# Patient Record
Sex: FEMALE | Race: WHITE | NOT HISPANIC OR LATINO | Employment: FULL TIME | URBAN - METROPOLITAN AREA
[De-identification: names, ages, dates, MRNs, and addresses within clinical notes are randomized per-mention and may not be internally consistent; named-entity substitution may affect disease eponyms.]

---

## 2023-11-22 ENCOUNTER — OFFICE VISIT (OUTPATIENT)
Dept: FAMILY MEDICINE | Facility: CLINIC | Age: 28
End: 2023-11-22
Payer: COMMERCIAL

## 2023-11-22 VITALS
OXYGEN SATURATION: 94 % | WEIGHT: 293 LBS | DIASTOLIC BLOOD PRESSURE: 94 MMHG | HEIGHT: 70 IN | BODY MASS INDEX: 41.95 KG/M2 | TEMPERATURE: 98.4 F | SYSTOLIC BLOOD PRESSURE: 160 MMHG | HEART RATE: 88 BPM

## 2023-11-22 DIAGNOSIS — F41.9 ANXIETY: ICD-10-CM

## 2023-11-22 DIAGNOSIS — E11.9 TYPE 2 DIABETES MELLITUS WITHOUT COMPLICATION, WITHOUT LONG-TERM CURRENT USE OF INSULIN (H): Primary | ICD-10-CM

## 2023-11-22 DIAGNOSIS — F33.0 MILD EPISODE OF RECURRENT MAJOR DEPRESSIVE DISORDER (H): ICD-10-CM

## 2023-11-22 DIAGNOSIS — J45.20 MILD INTERMITTENT ASTHMA WITHOUT COMPLICATION: ICD-10-CM

## 2023-11-22 DIAGNOSIS — I10 BENIGN ESSENTIAL HYPERTENSION: ICD-10-CM

## 2023-11-22 LAB — HBA1C MFR BLD: 7.5 % (ref 0–5.6)

## 2023-11-22 PROCEDURE — 80048 BASIC METABOLIC PNL TOTAL CA: CPT | Performed by: NURSE PRACTITIONER

## 2023-11-22 PROCEDURE — 80061 LIPID PANEL: CPT | Performed by: NURSE PRACTITIONER

## 2023-11-22 PROCEDURE — 99204 OFFICE O/P NEW MOD 45 MIN: CPT | Performed by: NURSE PRACTITIONER

## 2023-11-22 PROCEDURE — 36415 COLL VENOUS BLD VENIPUNCTURE: CPT | Performed by: NURSE PRACTITIONER

## 2023-11-22 PROCEDURE — 83036 HEMOGLOBIN GLYCOSYLATED A1C: CPT | Performed by: NURSE PRACTITIONER

## 2023-11-22 RX ORDER — LISINOPRIL 10 MG/1
10 TABLET ORAL DAILY
Qty: 90 TABLET | Refills: 1 | Status: SHIPPED | OUTPATIENT
Start: 2023-11-22 | End: 2024-05-21

## 2023-11-22 RX ORDER — ALBUTEROL SULFATE 90 UG/1
2 AEROSOL, METERED RESPIRATORY (INHALATION) EVERY 4 HOURS PRN
Qty: 18 G | Refills: 11 | Status: SHIPPED | OUTPATIENT
Start: 2023-11-22

## 2023-11-22 RX ORDER — ALBUTEROL SULFATE 90 UG/1
2 AEROSOL, METERED RESPIRATORY (INHALATION) EVERY 4 HOURS PRN
COMMUNITY
Start: 2022-09-12 | End: 2023-11-22

## 2023-11-22 RX ORDER — METFORMIN HCL 500 MG
1000 TABLET, EXTENDED RELEASE 24 HR ORAL
COMMUNITY
Start: 2022-10-14 | End: 2023-11-22

## 2023-11-22 RX ORDER — VENLAFAXINE HYDROCHLORIDE 150 MG/1
150 CAPSULE, EXTENDED RELEASE ORAL DAILY
Qty: 90 CAPSULE | Refills: 1 | Status: SHIPPED | OUTPATIENT
Start: 2023-11-22 | End: 2024-05-21

## 2023-11-22 RX ORDER — METFORMIN HCL 500 MG
1000 TABLET, EXTENDED RELEASE 24 HR ORAL
Qty: 180 TABLET | Refills: 1 | Status: SHIPPED | OUTPATIENT
Start: 2023-11-22 | End: 2024-05-21

## 2023-11-22 RX ORDER — VENLAFAXINE HYDROCHLORIDE 150 MG/1
150 CAPSULE, EXTENDED RELEASE ORAL DAILY
COMMUNITY
Start: 2023-10-02 | End: 2023-11-22

## 2023-11-22 NOTE — COMMUNITY RESOURCES LIST (ENGLISH)
11/22/2023   [facility]  N/A  For additional resource needs, please contact your health insurance member services or your primary care team.  Phone: [phone]   Email: N/A   Address: [facility] [place] [address]   Hours: N/A        Food and Nutrition       Food pantry  1  [facility] ([facility] Food Shelf) Distance: 1.34 miles      Pickup   [address] [place] [address]  Language: English  Hours: Mon 10:00 AM - 3:00 PM , Tue 12:00 PM - 5:00 PM , Wed 10:00 AM - 3:00 PM , Thu 2:00 PM - 7:00 PM  Fees: Free   Phone: (338) 923-7697 Email: support@[facility] Center[facility].Matchalarm Website: http://[facility] CenterFirst Wave Technologies.org     2  [facility] - Family Table Meals - [facility] Food Shelf Distance: 2.45 miles      Pickup   [address] [place] [address]  Language: English  Hours: Wed 12:00 PM - 2:00 PM  Fees: Free   Phone: (421) 321-2276 Email: Intellicyt@[facility].Matchalarm Website: http://www.Symphony.org     SNAP application assistance  3  [employer] [place] Distance: 20.28 miles      Phone/Virtual   [address] [facility] [address] [place] [address]  Language: English, Hmong, Tuvaluan, Honduran, Cymraes  Hours: Mon - Fri 8:30 AM - 4:30 PM  Fees: Free   Phone: (397) 319-2792 Email: helpline@hungersolutions.org Website: https://www.hungersolutions.org/programs/[place]-food-helpline/     4  [facility] Economic Assistance Department Distance: 7.14 miles      Phone/Virtual   [address] [address]  Language: English  Hours: Mon - Fri 8:15 AM - 4:00 PM  Fees: Free   Phone: (873) 307-3591 Email: paperwork@[facility]. Website: http://www.[facility]./193/Economic-Assistance     Soup kitchen or free meals  5  [facility] Meals Distance: 2.16 miles      In-Person   [address] [place] [address]  Language: English  Hours: Wed 5:30 PM - 6:15 PM  Fees: Free   Phone: (227) 516-8363 Email: karlee@[facility]  Website: http://www.LS9-ScienceLogicka.org/     6  [facility] - Family Table Meals - Family Table Meal Distance: 2.45 miles      [facility]   [address] [place] [address]  Language: English  Hours: Thu 5:00 PM - 6:30 PM  Fees: Free   Phone: (643) 545-7699 Email: brenton@Vitrina.Deskidea Website: http://www.[facility].Deskidea          Important Numbers & Websites       [place] [address] 211itedway.org  Poison Control   (852) 940-2173 [place].org  Suicide and Crisis Lifeline   986 98[facility] Medical Centerline.org  Childhelp [place] Child Abuse Hotline   852.642.4941 Childhelphotline.org  National Sexual Assault Hotline   (681) 122-4236 (HOPE) [facility] Runaway Safeline   (803) 456-3568 (RUNAWAY) [facility] E[facility] Clairerunaway.org  Pregnancy & Postpartum Support [place]   Call/text 733-734-2450 Ppsupportmn.org  Substance Abuse National Helpline ([facility]   179-023-HELP (5354) Findtreatment.gov  Emergency Services   911

## 2023-11-22 NOTE — COMMUNITY RESOURCES LIST (ENGLISH)
11/22/2023   [facility]  N/A  For questions about this resource list or additional care needs, please contact your primary care clinic or care manager.  Phone: [phone]   Email: N/A   Address: [address] [place] [address]   Hours: N/A        Food and Nutrition       Food pantry  1  [facility] ([facility] Food Shelf) Distance: 1.34 miles      Pickup   [address] [place] [address]  Language: English  Hours: Mon 10:00 AM - 3:00 PM , Tue 12:00 PM - 5:00 PM , Wed 10:00 AM - 3:00 PM , Thu 2:00 PM - 7:00 PM  Fees: Free   Phone: (866) 860-2360 Email: support@[facility] CenterTRA.[place] Printer Service Website: http://GoCoinbcValidas.org     2  [facility] - Family Table Meals - [facility] Food [facility] Distance: 2.45 miles      Pickup   [address] [place] [address]  Language: English  Hours: Wed 12:00 PM - 2:00 PM  Fees: Free   Phone: (167) 520-7758 Email: Heartbeater.com@The Personal Bee.[place] Pr[employer] Service Website: http://www.The Personal Bee.org     SNAP application assistance  3  [facility] Economic Assistance Department Distance: 7.14 miles      Phone/Virtual   [address] [address]  Language: English  Hours: Mon - Fri 8:15 AM - 4:00 PM  Fees: Free   Phone: (822) 114-9293 Email: paperwork@co.[place].mn. Website: http://www.Gowrievocaltap./193/Economic-Assistance     4  [facility] [place] [facility] Distance: 7.22 miles      Phone/Virtual   [address] [place] [address]  Language: English, Montserratian  Hours: Mon 9:00 AM - 1:00 PM , Tue - Thu 9:00 AM - 4:00 PM , Fri 9:00 AM - 1:00 PM  Fees: Free   Phone: (991) 477-7864 Email: info@[employer].org Website: http://www.Simpa Networksarvest.org/     Soup kitchen or free meals  5  [facility] Meals Distance: 2.16 miles      In-Person   [address] [place] [address]  Language: English  Hours: Wed 5:30 PM - 6:15 PM  Fees: Free   Phone: (498) 461-9294 Email: karlee@[facility]  Website: http://www.zSoup-ExecMobileka.org/     6  [facility] - Family Table Meals - Family Table Meal Distance: 2.45 miles      [facility]   [address] [place] [address]  Language: English  Hours: Thu 5:00 PM - 6:30 PM  Fees: Free   Phone: (783) 996-3794 Email: brenton@RaveMobileSafety.com.Alianza Website: http://www.[facility].Alianza          Important Numbers & Websites       Emergency Services   911  [facility] Services   311  Poison Control   (875) 878-1141  Suicide Prevention Lifeline   (715) 653-7293 (TALK)  Child Abuse Hotline   (415) 607-2849 (4-A-Child)  Sexual Assault Hotline   (706) 199-2544 (HOPE)  National Runaway Safeline   (529) 466-4867 (RUNAWAY)  All-Options Talkline   (818) 526-7366  Substance Abuse Referral   (379) 588-4515 (HELP)

## 2023-11-22 NOTE — PROGRESS NOTES
"      Assessment & Plan     Type 2 diabetes mellitus without complication, without long-term current use of insulin (H)  Chronic, not at goal.  Cannot tolerate higher doses of metformin.  Add Trulicity 0.75.  Continue metformin.  Follow-up in 3 months.  - Hemoglobin A1c; Future  - Basic metabolic panel  (Ca, Cl, CO2, Creat, Gluc, K, Na, BUN); Future  - Lipid panel reflex to direct LDL Fasting; Future  - metFORMIN (GLUCOPHAGE XR) 500 MG 24 hr tablet; Take 2 tablets (1,000 mg) by mouth daily before breakfast  - Hemoglobin A1c  - Basic metabolic panel  (Ca, Cl, CO2, Creat, Gluc, K, Na, BUN)  - Lipid panel reflex to direct LDL Fasting  - dulaglutide (TRULICITY) 0.75 MG/0.5ML pen; Inject 0.75 mg Subcutaneous every 7 days    Benign essential hypertension  Chronic, not controlled.  Restart lisinopril 10 mg.  Follow-up in 3 months   - lisinopril (ZESTRIL) 10 MG tablet; Take 1 tablet (10 mg) by mouth daily    Anxiety  Chronic, stable. Continue Effexor.   - venlafaxine (EFFEXOR XR) 150 MG 24 hr capsule; Take 1 capsule (150 mg) by mouth daily    Mild episode of recurrent major depressive disorder (H24)  Chronic, stable.  Continue Effexor.     - venlafaxine (EFFEXOR XR) 150 MG 24 hr capsule; Take 1 capsule (150 mg) by mouth daily    Mild intermittent asthma without complication  Chronic, stable.  Continue albuterol prn.     - albuterol (PROAIR HFA/PROVENTIL HFA/VENTOLIN HFA) 108 (90 Base) MCG/ACT inhaler; Inhale 2 puffs into the lungs every 4 hours as needed for other (when has cold or allergies act up)       BMI:   Estimated body mass index is 59.26 kg/m  as calculated from the following:    Height as of this encounter: 1.778 m (5' 10\").    Weight as of this encounter: 187.3 kg (413 lb).           Aurdey [facility] is a 28 year old, presenting for the following health issues:  Establish Care      11/22/2023     3:48 PM   Additional Questions   Roomed by stevenson" "  Accompanied by self       History of Present Illness       Reason for visit:  Refill of perscription after leaving former clinic    She eats 0-1 servings of fruits and vegetables daily.She consumes 0 sweetened beverage(s) daily.She exercises with enough effort to increase her heart rate 9 or less minutes per day.  She exercises with enough effort to increase her heart rate 3 or less days per week. She is missing 1 dose(s) of medications per week.         New patient to clinic.  Insurance changed so she hasn't been seen for a while.   Hx of diabetes.  Taking Metformin 1000 mg.  Had side effects from higher dose.  Labs have not been checked in over a year, she thinks A1C will be high.  Also hx of HTN, was on lisinopril 10 mg but no longer taking. Anxiety and depression controlled with Effexor 150 mg.      Denies any numbness/tingling in her feet.   Reports strong family hx of DM2, both parents have it too.           Review of Systems   Constitutional, HEENT, cardiovascular, pulmonary, GI, , musculoskeletal, neuro, skin, endocrine and psych systems are negative, except as otherwise noted.      Objective    BP (!) 160/94   Pulse 88   Temp 98.4  F (36.9  C)   Ht 1.778 m (5' 10\")   Wt (!) 187.3 kg (413 lb)   LMP 11/20/2023 (Approximate)   SpO2 94%   BMI 59.26 kg/m    Body mass index is 59.26 kg/m .  Physical Exam   GENERAL: healthy, alert and no distress  EYES: Eyes grossly normal to inspection, PERRL and conjunctivae and sclerae normal  RESP: lungs clear to auscultation - no rales, rhonchi or wheezes  CV: regular rate and rhythm, normal S1 S2, no S3 or S4, no murmur, click or rub, no peripheral edema and peripheral pulses strong  MS: no gross musculoskeletal defects noted, no edema  SKIN: no suspicious lesions or rashes  NEURO: Normal strength and tone, mentation intact and speech normal    Lab Results   Component Value Date    A1C 7.5 11/22/2023     Recent Labs   Lab Test 11/22/23  1612      POTASSIUM " 3.9   CHLORIDE 100   CO2 25   ANIONGAP 14   *   BUN 17.0   CR 0.49*   ASUNCION 9.2     Recent Labs   Lab Test 11/22/23  1612   CHOL 167   HDL 30*   LDL 73   TRIG 320*

## 2023-11-22 NOTE — PATIENT INSTRUCTIONS
Continue metformin.  If A1C is high we will add Trulicity.   Restart lisinopril 10 mg once daily in the morning for blood pressure.   Venlafaxine refilled.

## 2023-11-23 LAB
ANION GAP SERPL CALCULATED.3IONS-SCNC: 14 MMOL/L (ref 7–15)
BUN SERPL-MCNC: 17 MG/DL (ref 6–20)
CALCIUM SERPL-MCNC: 9.2 MG/DL (ref 8.6–10)
CHLORIDE SERPL-SCNC: 100 MMOL/L (ref 98–107)
CHOLEST SERPL-MCNC: 167 MG/DL
CREAT SERPL-MCNC: 0.49 MG/DL (ref 0.51–0.95)
DEPRECATED HCO3 PLAS-SCNC: 25 MMOL/L (ref 22–29)
EGFRCR SERPLBLD CKD-EPI 2021: >90 ML/MIN/1.73M2
GLUCOSE SERPL-MCNC: 116 MG/DL (ref 70–99)
HDLC SERPL-MCNC: 30 MG/DL
LDLC SERPL CALC-MCNC: 73 MG/DL
NONHDLC SERPL-MCNC: 137 MG/DL
POTASSIUM SERPL-SCNC: 3.9 MMOL/L (ref 3.4–5.3)
SODIUM SERPL-SCNC: 139 MMOL/L (ref 135–145)
TRIGL SERPL-MCNC: 320 MG/DL

## 2023-12-31 ENCOUNTER — HEALTH MAINTENANCE LETTER (OUTPATIENT)
Age: 28
End: 2023-12-31

## 2024-03-10 ENCOUNTER — HEALTH MAINTENANCE LETTER (OUTPATIENT)
Age: 29
End: 2024-03-10

## 2024-04-01 ENCOUNTER — OFFICE VISIT (OUTPATIENT)
Dept: URGENT CARE | Facility: URGENT CARE | Age: 29
End: 2024-04-01
Payer: COMMERCIAL

## 2024-04-01 VITALS
RESPIRATION RATE: 17 BRPM | SYSTOLIC BLOOD PRESSURE: 175 MMHG | TEMPERATURE: 97.7 F | HEART RATE: 90 BPM | OXYGEN SATURATION: 98 % | DIASTOLIC BLOOD PRESSURE: 79 MMHG | BODY MASS INDEX: 58.61 KG/M2 | WEIGHT: 293 LBS

## 2024-04-01 DIAGNOSIS — H69.92 DYSFUNCTION OF LEFT EUSTACHIAN TUBE: Primary | ICD-10-CM

## 2024-04-01 PROCEDURE — 99213 OFFICE O/P EST LOW 20 MIN: CPT | Performed by: PHYSICIAN ASSISTANT

## 2024-04-01 RX ORDER — CETIRIZINE HYDROCHLORIDE 10 MG/1
10 TABLET ORAL DAILY
Qty: 30 TABLET | Refills: 0 | Status: SHIPPED | OUTPATIENT
Start: 2024-04-01 | End: 2024-05-01

## 2024-04-01 RX ORDER — FLUTICASONE PROPIONATE 50 MCG
1 SPRAY, SUSPENSION (ML) NASAL DAILY
Qty: 15.8 ML | Refills: 0 | Status: SHIPPED | OUTPATIENT
Start: 2024-04-01

## 2024-04-01 ASSESSMENT — ENCOUNTER SYMPTOMS
SINUS PRESSURE: 0
TROUBLE SWALLOWING: 0
SORE THROAT: 0
FEVER: 0
RHINORRHEA: 1

## 2024-04-01 NOTE — PROGRESS NOTES
Patient presents with:  Otalgia: Lt ear plugged since last Tuesday. Pt denies any other sx.       Clinical Decision Making:  DDX: AOM, cerumen impaction, otitis media w/effusion, eustachian tube dysfunction    ICD-10-CM    1. Dysfunction of left eustachian tube  H69.92 cetirizine (ZYRTEC) 10 MG tablet     fluticasone (FLONASE) 50 MCG/ACT nasal spray      Symptoms today are most consistent with a eustachian tube dysfunction after she recently had a sounds like viral URI.  I do not think that this is any acute etiology as there is no ringing in her ears or pain.  Recommended use of nasal steroids along with a antihistamine should resolve this in the next week or so.  Continue using the counter medication for fever and pain control.    Patient Instructions   1.  You have a small middle ear effusion.   2.  Start using the Zyrtec 1 pill once per day.   3.  Use Flonase 1 spray per nostril once per day.  4. Follow up if no improvement in 10 days. Follow up sooner if pain or fevers develops.     HPI:  Pb Childress is a 28 year old female who presents today with concerns of muffled sounds for the past 6 days.  Patient had a recent URI that is now resolved but patient is still concerned due to muffled noises out of the left ear only.  Patient has been afebrile    History obtained from the patient.    Problem List:  2012-09: Mild intermittent asthma  2010-12: Menometrorrhagia      No past medical history on file.    Social History     Tobacco Use    Smoking status: Never    Smokeless tobacco: Never   Substance Use Topics    Alcohol use: Not on file       Review of Systems   Constitutional:  Negative for fever.   HENT:  Positive for hearing loss (muffled sounds in left ear) and rhinorrhea (resolved). Negative for ear pain, sinus pressure, sore throat and trouble swallowing.        Vitals:    04/01/24 1506   BP: (!) 175/79   BP Location: Right arm   Cuff Size: Adult Regular   Pulse: 90   Resp: 17   Temp: 97.7  F (36.5  C)    TempSrc: Tympanic   SpO2: 98%   Weight: (!) 185.3 kg (408 lb 8 oz)       Physical Exam  Vitals and nursing note reviewed.   Constitutional:       General: She is not in acute distress.     Appearance: She is not toxic-appearing or diaphoretic.   HENT:      Head: Normocephalic and atraumatic.      Right Ear: External ear normal.      Left Ear: External ear normal. A middle ear effusion is present. No hemotympanum. Tympanic membrane is not erythematous.      Mouth/Throat:      Mouth: Mucous membranes are moist.      Pharynx: Oropharynx is clear.   Eyes:      Conjunctiva/sclera: Conjunctivae normal.      Pupils: Pupils are equal, round, and reactive to light.   Cardiovascular:      Rate and Rhythm: Normal rate and regular rhythm.      Pulses: Normal pulses.      Heart sounds: Normal heart sounds. No murmur heard.  Pulmonary:      Effort: Pulmonary effort is normal. No respiratory distress.      Breath sounds: Normal breath sounds.   Musculoskeletal:      Cervical back: Normal range of motion and neck supple.   Neurological:      Mental Status: She is alert.   Psychiatric:         Mood and Affect: Mood normal.         Behavior: Behavior normal.         Thought Content: Thought content normal.         Judgment: Judgment normal.     At the end of the encounter, I discussed results, diagnosis, medications. Discussed red flags for immediate return to clinic/ER, as well as indications for follow up if no improvement. Patient understood and agreed to plan. Patient was stable for discharge.

## 2024-04-01 NOTE — PATIENT INSTRUCTIONS
1.  You have a small middle ear effusion.   2.  Start using the Zyrtec 1 pill once per day.   3.  Use Flonase 1 spray per nostril once per day.  4. Follow up if no improvement in 10 days. Follow up sooner if pain or fevers develops.

## 2024-04-18 NOTE — PROGRESS NOTES
Assessment & Plan     Excessive or frequent menstruation  Discussed possible etiologies for the heavy menstrual cycles. Bloodwork per below and plan on sending patient for a pelvic ultrasound to further evaluate the reproductive structures. We briefly reviewed risks and benefits of medical versus surgical therapy. Medical therapy reviewed included hormonal manipulation with continuous progesterone. Reviewed surgical options such as endometrial ablation versus hysterectomy.  Discussed that endometrial ablation is minimally invasive compared to hysterectomy but may not be definitive. At some point in the future, would be interested in considering hysterectomy as children are not desired. For now, likely would want to try continuous oral progesterone. We discussed use, taking it regularly, possible side effects and management of heavy cycles. We will notify her when we have all her results available and if she still desires oral progesterone, will send prescription. She is given an opportunity to ask questions and have them answered.  - US Pelvic Transabdominal and Transvaginal; Future  - CBC with platelets; Future  - CBC with platelets    Kaila Ambriz is a 28 year old, presenting for the following health issues:  Consult (IUD)    HPI       Consult- IUD      Scheduled as consult for IUD, but would like to discuss available options for managing heavy menstrual cycles. Cycles are regular each month, last 7 days with all 7 days being heavy flow-starts heavy and does not taper at the end. Has cramps mostly at night, does pass clots of varying size. Notes some fatigue. No intermenstrual bleeding. Mother had a history of heavy cycles and had resolution with an endometrial ablation.  No previous work up for the cycles. Around age 15 did do combined oral contraceptive pills for 3-6 months-still bled while on pills. Has done Depo Provera x 1 dose. Believes she still have menstrual bleeding during that time.  Never  "been sexually active. Not planning any pregnancy at all.   No previous pap smear and declines today. PMH notable for type 2 DM and HTN. Would not be a candidate for estrogen.    Review of Systems  Constitutional, HEENT, cardiovascular, pulmonary, gi and gu systems are negative, except as otherwise noted.      Objective    /83 (BP Location: Right arm, Patient Position: Sitting, Cuff Size: Adult Large)   Pulse 91   Ht 1.778 m (5' 10\")   Wt (!) 179.6 kg (396 lb)   LMP 04/05/2024 (Exact Date)   SpO2 97%   BMI 56.82 kg/m    Body mass index is 56.82 kg/m .  Physical Exam   GENERAL: alert and no distress   (female): Declines  MS: no gross musculoskeletal defects noted, no edema  SKIN: no suspicious lesions or rashes  PSYCH: mentation appears normal, affect normal/bright    Results for orders placed or performed in visit on 04/22/24 (from the past 24 hour(s))   CBC with platelets   Result Value Ref Range    WBC Count 8.2 4.0 - 11.0 10e3/uL    RBC Count 4.85 3.80 - 5.20 10e6/uL    Hemoglobin 11.2 (L) 11.7 - 15.7 g/dL    Hematocrit 36.4 35.0 - 47.0 %    MCV 75 (L) 78 - 100 fL    MCH 23.1 (L) 26.5 - 33.0 pg    MCHC 30.8 (L) 31.5 - 36.5 g/dL    RDW 15.9 (H) 10.0 - 15.0 %    Platelet Count 308 150 - 450 10e3/uL           Signed Electronically by: ROXI Salazar CNP    "

## 2024-04-22 ENCOUNTER — OFFICE VISIT (OUTPATIENT)
Dept: OBGYN | Facility: CLINIC | Age: 29
End: 2024-04-22
Payer: COMMERCIAL

## 2024-04-22 VITALS
WEIGHT: 293 LBS | HEART RATE: 91 BPM | BODY MASS INDEX: 41.95 KG/M2 | HEIGHT: 70 IN | OXYGEN SATURATION: 97 % | SYSTOLIC BLOOD PRESSURE: 139 MMHG | DIASTOLIC BLOOD PRESSURE: 83 MMHG

## 2024-04-22 DIAGNOSIS — N92.0 EXCESSIVE OR FREQUENT MENSTRUATION: Primary | ICD-10-CM

## 2024-04-22 LAB
ERYTHROCYTE [DISTWIDTH] IN BLOOD BY AUTOMATED COUNT: 15.9 % (ref 10–15)
HCT VFR BLD AUTO: 36.4 % (ref 35–47)
HGB BLD-MCNC: 11.2 G/DL (ref 11.7–15.7)
MCH RBC QN AUTO: 23.1 PG (ref 26.5–33)
MCHC RBC AUTO-ENTMCNC: 30.8 G/DL (ref 31.5–36.5)
MCV RBC AUTO: 75 FL (ref 78–100)
PLATELET # BLD AUTO: 308 10E3/UL (ref 150–450)
RBC # BLD AUTO: 4.85 10E6/UL (ref 3.8–5.2)
WBC # BLD AUTO: 8.2 10E3/UL (ref 4–11)

## 2024-04-22 PROCEDURE — 36415 COLL VENOUS BLD VENIPUNCTURE: CPT | Performed by: NURSE PRACTITIONER

## 2024-04-22 PROCEDURE — 85027 COMPLETE CBC AUTOMATED: CPT | Performed by: NURSE PRACTITIONER

## 2024-04-22 PROCEDURE — 99203 OFFICE O/P NEW LOW 30 MIN: CPT | Performed by: NURSE PRACTITIONER

## 2024-04-22 NOTE — PATIENT INSTRUCTIONS
If you have any questions regarding your visit, Please contact your care team.     [facility]: [phone]  To Schedule an Appointment 24/7  Call: [phone]ONE [facility]     Raquel Petersen- APRN CNP      Opal Ortega-Surgery Scheduler  Liz-Surgery Scheduler         Monday 7:30am-2:00pm    Tuesday 7:30am-4:00pm    Wednesday 7:30am-2:00pm    Thursday 7:30am-11:00am    Friday 7:30am-2:00pm [address] [address]  Phone- [phone]   Fax- [phone]     Imaging Scheduling all locations  [phone]    [facility]  [address] [place] [address]   [place] [address] [phone]         Urgent Care locations:  [facility]   Monday-Friday  10 am - 8 pm  Saturday and Sunday   9 am - 5 pm     [phone]   If you need a medication refill, please contact your pharmacy. Please allow 3 business days for your refill to be completed.  As always, Thank you for trusting us with your healthcare needs!      see additional instructions from your care team below

## 2024-05-21 DIAGNOSIS — I10 BENIGN ESSENTIAL HYPERTENSION: ICD-10-CM

## 2024-05-21 DIAGNOSIS — E11.9 TYPE 2 DIABETES MELLITUS WITHOUT COMPLICATION, WITHOUT LONG-TERM CURRENT USE OF INSULIN (H): ICD-10-CM

## 2024-05-21 DIAGNOSIS — F33.0 MILD EPISODE OF RECURRENT MAJOR DEPRESSIVE DISORDER (H): ICD-10-CM

## 2024-05-21 DIAGNOSIS — F41.9 ANXIETY: ICD-10-CM

## 2024-05-21 RX ORDER — VENLAFAXINE HYDROCHLORIDE 150 MG/1
150 CAPSULE, EXTENDED RELEASE ORAL DAILY
Qty: 90 CAPSULE | Refills: 0 | Status: SHIPPED | OUTPATIENT
Start: 2024-05-21 | End: 2024-08-19

## 2024-05-21 RX ORDER — METFORMIN HCL 500 MG
1000 TABLET, EXTENDED RELEASE 24 HR ORAL
Qty: 180 TABLET | Refills: 0 | Status: SHIPPED | OUTPATIENT
Start: 2024-05-21 | End: 2024-08-20

## 2024-05-21 RX ORDER — LISINOPRIL 10 MG/1
10 TABLET ORAL DAILY
Qty: 90 TABLET | Refills: 1 | Status: SHIPPED | OUTPATIENT
Start: 2024-05-21

## 2024-05-21 NOTE — LETTER
May 21, 2024    Pb Childress  [address]  [place] [facility] [address]    Dear Pb,       We recently received a refill request for lisinopril (ZESTRIL) 10 MG tablet, metFORMIN (GLUCOPHAGE XR) 500 MG 24 hr tablet, and venlafaxine (EFFEXOR XR) 150 MG 24 hr capsule.  We have refilled this for a one time 90 day supply only because you are due for a:    Diabetic Follow Up office visit      Please call at your earliest convenience so that there will not be a delay with your future refills.          Thank you,   Your [facility] Team/[place]  [phone]

## 2024-05-28 ENCOUNTER — ANCILLARY PROCEDURE (OUTPATIENT)
Dept: ULTRASOUND IMAGING | Facility: CLINIC | Age: 29
End: 2024-05-28
Attending: NURSE PRACTITIONER
Payer: COMMERCIAL

## 2024-05-28 DIAGNOSIS — N92.0 EXCESSIVE OR FREQUENT MENSTRUATION: ICD-10-CM

## 2024-05-28 PROCEDURE — 76856 US EXAM PELVIC COMPLETE: CPT | Mod: TC | Performed by: RADIOLOGY

## 2024-05-28 PROCEDURE — 76830 TRANSVAGINAL US NON-OB: CPT | Mod: TC | Performed by: RADIOLOGY

## 2024-05-29 ENCOUNTER — MYC MEDICAL ADVICE (OUTPATIENT)
Dept: OBGYN | Facility: CLINIC | Age: 29
End: 2024-05-29
Payer: COMMERCIAL

## 2024-05-29 DIAGNOSIS — N92.0 EXCESSIVE OR FREQUENT MENSTRUATION: Primary | ICD-10-CM

## 2024-05-29 RX ORDER — NORETHINDRONE ACETATE 5 MG
5 TABLET ORAL DAILY
Qty: 90 TABLET | Refills: 1 | Status: SHIPPED | OUTPATIENT
Start: 2024-05-29

## 2024-05-29 NOTE — TELEPHONE ENCOUNTER
Prescription sent. We had discussed use of the medication, taking it regularly, possible side effects at the time of her office visit. Raquel DIANE CNP

## 2024-05-29 NOTE — TELEPHONE ENCOUNTER
"Pt is asking for a progesterone rx.    Per 4/22/24 OV notes:  \"For now, likely would want to try continuous oral progesterone. We discussed use, taking it regularly, possible side effects and management of heavy cycles. We will notify her when we have all her results available and if she still desires oral progesterone, will send prescription.\"    Pt had an US yesterday, 5/28.  Per US note:  \"If you are still interested in trying the oral progesterone pills we discussed at your pharmacy, please let me know and I can get that sent in for you to start. Also, please let me know if you've decided on a different progesterone option.\"     Pended desired pharmacy and routing to ROXI Deluac CNP, to send in an rx for progesterone as pt is requesting.    Sarah Catalan RN        "

## 2024-06-09 ENCOUNTER — MYC MEDICAL ADVICE (OUTPATIENT)
Dept: OBGYN | Facility: CLINIC | Age: 29
End: 2024-06-09
Payer: COMMERCIAL

## 2024-06-10 NOTE — TELEPHONE ENCOUNTER
"Pt was prescribed norethindrone (AYGESTIN) 5 MG tablet on 5/29/24 and states she has been breaking out in hives/rash on her stomach since starting the Aygestin.    Pt denies starting anything else new recently except the above medication.  She has used an anti-itch lotion without much relief.  She continues to experiencing \"super itchy\" rash.    Routing to Raquel and provider [place] (Raquel is out through tomorrow) to see if pt should stop the above medication.  I will also advise Benadryl and be seen in UC/ER if having difficulty breathing or swallowing.    Sarah Catalan, MC    "

## 2024-06-10 NOTE — TELEPHONE ENCOUNTER
I recommend she stop the medication to see if the rash/itching goes away, but she can expect to have bleeding.  She can monitor and call in if it is too heavy and we will consider lysteda.  She should follow up with Raquel to come up with an alternative plan.    If the rash does not improve with cessation of norethindrone, then another etiology should be considered.  Agree with benadryl.

## 2024-07-28 ENCOUNTER — HEALTH MAINTENANCE LETTER (OUTPATIENT)
Age: 29
End: 2024-07-28

## 2024-08-19 DIAGNOSIS — E11.9 TYPE 2 DIABETES MELLITUS WITHOUT COMPLICATION, WITHOUT LONG-TERM CURRENT USE OF INSULIN (H): ICD-10-CM

## 2024-08-19 DIAGNOSIS — F33.0 MILD EPISODE OF RECURRENT MAJOR DEPRESSIVE DISORDER (H): ICD-10-CM

## 2024-08-19 DIAGNOSIS — F41.9 ANXIETY: ICD-10-CM

## 2024-08-19 RX ORDER — VENLAFAXINE HYDROCHLORIDE 150 MG/1
150 CAPSULE, EXTENDED RELEASE ORAL DAILY
Qty: 90 CAPSULE | Refills: 0 | Status: SHIPPED | OUTPATIENT
Start: 2024-08-19

## 2024-08-19 NOTE — LETTER
August 20, 2024    Pb Childress  [address]  [place] [address]    Dear Pb,       We recently received a refill request for venlafaxine (EFFEXOR XR) 150 MG 24 hr capsule and  metFORMIN (GLUCOPHAGE XR) 500 MG 24 hr tablet.  We have refilled this for a one time 90 day supply only because you are due for a:    Follow Up office visit      Please call at your earliest convenience so that there will not be a delay with your future refills.          Thank you,   Your [facility] Team/[place]  [phone]

## 2024-08-20 RX ORDER — METFORMIN HCL 500 MG
1000 TABLET, EXTENDED RELEASE 24 HR ORAL
Qty: 180 TABLET | Refills: 0 | Status: SHIPPED | OUTPATIENT
Start: 2024-08-20

## 2024-11-19 DIAGNOSIS — E11.9 TYPE 2 DIABETES MELLITUS WITHOUT COMPLICATION, WITHOUT LONG-TERM CURRENT USE OF INSULIN (H): ICD-10-CM

## 2024-11-19 DIAGNOSIS — N92.0 EXCESSIVE OR FREQUENT MENSTRUATION: ICD-10-CM

## 2024-11-19 DIAGNOSIS — F33.0 MILD EPISODE OF RECURRENT MAJOR DEPRESSIVE DISORDER (H): ICD-10-CM

## 2024-11-19 DIAGNOSIS — F41.9 ANXIETY: ICD-10-CM

## 2024-11-19 NOTE — LETTER
November 20, 2024    Pb Childress  [address]  [facility] [place] [facility] [address]    Dear Pb,       We recently received a refill request for venlafaxine (EFFEXOR XR) 150 MG 24 hr capsule and metFORMIN (GLUCOPHAGE XR) 500 MG 24 hr tablet.  We have refilled this for a one time 90 day supply only because you are due for a:    Medication Follow Up office visit      Please call at your earliest convenience so that there will not be a delay with your future refills.          Thank you,   Your [facility] Team/[place]  [phone]

## 2024-11-19 NOTE — TELEPHONE ENCOUNTER
norethindrone (AYGESTIN) 5 MG tablet       Last Written Prescription Date:  5/29/24  Last Fill Quantity: 90,   # refills: 1  Last Office Visit: 4/22/24  Future Office visit:         Per 6/9/24 mychart encounter pt developed a rash after starting above medication.  She was advised to stop the medication to see if her rash improved.  She was also advised to reach out if her bleeding became bad after stopping the medication.    Sending pt a ezCater message to see if she has continued to take the norethindrone and if so, how her bleeding has been on it.    Sarah Catalan RN- OB/GYN group

## 2024-11-20 RX ORDER — VENLAFAXINE HYDROCHLORIDE 150 MG/1
150 CAPSULE, EXTENDED RELEASE ORAL DAILY
Qty: 90 CAPSULE | Refills: 0 | Status: SHIPPED | OUTPATIENT
Start: 2024-11-20

## 2024-11-20 RX ORDER — METFORMIN HYDROCHLORIDE 500 MG/1
1000 TABLET, EXTENDED RELEASE ORAL
Qty: 180 TABLET | Refills: 0 | Status: SHIPPED | OUTPATIENT
Start: 2024-11-20

## 2024-11-20 NOTE — TELEPHONE ENCOUNTER
Unable to reach patient via phone. RN left a message and instructed patient to call the clinic at [phone].    See message below.     Lisa LEES RN -  OB/GYN group

## 2024-11-22 NOTE — TELEPHONE ENCOUNTER
Unable to reach patient via phone. RN left a message and instructed patient to call the clinic at [phone].     Attempted to call pt X2, mychart message that was sent has not been read.     -please see message below from 11/19/2024    Lisa LEES RN -  OB/GYN group

## 2024-11-25 NOTE — TELEPHONE ENCOUNTER
Patient has not read [employer] message.   Will check back with patient tomorrow 11/26/2024.    Lisa LEES RN -  [facility]

## 2024-11-26 RX ORDER — NORETHINDRONE 5 MG/1
5 TABLET ORAL DAILY
OUTPATIENT
Start: 2024-11-26

## 2024-11-26 NOTE — TELEPHONE ENCOUNTER
Unable to reach patient via phone. RN left a message and instructed patient to call the clinic at [phone].    [employer] message sent on 11/19/2024 has not been read.     Calling patient to ask if she is requesting this med refill or if the pharmacy is sending an auto refill request.     Per 6/9/24 mychart encounter pt developed a rash after starting above medication. She was advised to stop the medication to see if her rash improved. She was also advised to reach out if her bleeding became bad after stopping the medication.     -attempted to contact patient X5.    Routing to provider for eval.     Lisa LEES RN -  [facility]

## 2024-11-26 NOTE — TELEPHONE ENCOUNTER
Will deny for now as she stopped it due to side effects and have not had any follow up. Raquel DIANE CNP

## 2024-12-15 ENCOUNTER — HEALTH MAINTENANCE LETTER (OUTPATIENT)
Age: 29
End: 2024-12-15

## 2025-01-19 ENCOUNTER — HEALTH MAINTENANCE LETTER (OUTPATIENT)
Age: 30
End: 2025-01-19

## 2025-02-22 DIAGNOSIS — F41.9 ANXIETY: ICD-10-CM

## 2025-02-22 DIAGNOSIS — E11.9 TYPE 2 DIABETES MELLITUS WITHOUT COMPLICATION, WITHOUT LONG-TERM CURRENT USE OF INSULIN (H): ICD-10-CM

## 2025-02-22 DIAGNOSIS — F33.0 MILD EPISODE OF RECURRENT MAJOR DEPRESSIVE DISORDER: ICD-10-CM

## 2025-02-22 NOTE — LETTER
February 27, 2025    Pb Childress  [id number]  [facility] [address]    Dear Pb,       We received a refill request for the VENLAFAXINE HCL  MG CAP and METFORMIN HCL  MG TABLET but you will need to schedule an appointment to receive this refill per your provider, Audrey Cote.    Please send a [employer] message or call us at [phone] so we can get your scheduled right away and avoid any medication issues.                Thank you,   Your [facility] Team/[place]  [phone]          Electronically signed

## 2025-02-24 RX ORDER — METFORMIN HYDROCHLORIDE 500 MG/1
1000 TABLET, EXTENDED RELEASE ORAL
Qty: 180 TABLET | Refills: 0 | OUTPATIENT
Start: 2025-02-24

## 2025-02-24 RX ORDER — VENLAFAXINE HYDROCHLORIDE 150 MG/1
150 CAPSULE, EXTENDED RELEASE ORAL DAILY
Qty: 90 CAPSULE | Refills: 0 | OUTPATIENT
Start: 2025-02-24

## 2025-02-27 NOTE — TELEPHONE ENCOUNTER
Have left 2 voicemail's to schedule, no response from patient. Should we send another letter?        Josue Contreras

## 2025-03-16 ENCOUNTER — HEALTH MAINTENANCE LETTER (OUTPATIENT)
Age: 30
End: 2025-03-16

## 2025-04-07 ENCOUNTER — MYC REFILL (OUTPATIENT)
Dept: FAMILY MEDICINE | Facility: CLINIC | Age: 30
End: 2025-04-07
Payer: COMMERCIAL

## 2025-04-07 DIAGNOSIS — E11.9 TYPE 2 DIABETES MELLITUS WITHOUT COMPLICATION, WITHOUT LONG-TERM CURRENT USE OF INSULIN (H): ICD-10-CM

## 2025-04-07 DIAGNOSIS — J45.20 MILD INTERMITTENT ASTHMA WITHOUT COMPLICATION: ICD-10-CM

## 2025-04-07 DIAGNOSIS — F41.9 ANXIETY: ICD-10-CM

## 2025-04-07 DIAGNOSIS — F33.0 MILD EPISODE OF RECURRENT MAJOR DEPRESSIVE DISORDER: ICD-10-CM

## 2025-04-07 RX ORDER — VENLAFAXINE HYDROCHLORIDE 150 MG/1
150 CAPSULE, EXTENDED RELEASE ORAL DAILY
Qty: 90 CAPSULE | Refills: 0 | Status: SHIPPED | OUTPATIENT
Start: 2025-04-07

## 2025-04-07 RX ORDER — METFORMIN HYDROCHLORIDE 500 MG/1
1000 TABLET, EXTENDED RELEASE ORAL
Qty: 180 TABLET | Refills: 0 | Status: SHIPPED | OUTPATIENT
Start: 2025-04-07

## 2025-04-07 RX ORDER — ALBUTEROL SULFATE 90 UG/1
2 INHALANT RESPIRATORY (INHALATION) EVERY 4 HOURS PRN
Qty: 18 G | Refills: 0 | Status: SHIPPED | OUTPATIENT
Start: 2025-04-07

## 2025-06-29 ENCOUNTER — HEALTH MAINTENANCE LETTER (OUTPATIENT)
Age: 30
End: 2025-06-29

## 2025-07-15 ENCOUNTER — MYC MEDICAL ADVICE (OUTPATIENT)
Dept: FAMILY MEDICINE | Facility: CLINIC | Age: 30
End: 2025-07-15
Payer: COMMERCIAL

## 2025-07-15 DIAGNOSIS — E11.9 TYPE 2 DIABETES MELLITUS WITHOUT COMPLICATION, WITHOUT LONG-TERM CURRENT USE OF INSULIN (H): ICD-10-CM

## 2025-07-15 DIAGNOSIS — F41.9 ANXIETY: ICD-10-CM

## 2025-07-15 DIAGNOSIS — F33.0 MILD EPISODE OF RECURRENT MAJOR DEPRESSIVE DISORDER: ICD-10-CM

## 2025-07-16 RX ORDER — METFORMIN HYDROCHLORIDE 500 MG/1
1000 TABLET, EXTENDED RELEASE ORAL
Qty: 180 TABLET | Refills: 0 | Status: SHIPPED | OUTPATIENT
Start: 2025-07-16

## 2025-07-16 RX ORDER — VENLAFAXINE HYDROCHLORIDE 150 MG/1
150 CAPSULE, EXTENDED RELEASE ORAL DAILY
Qty: 90 CAPSULE | Refills: 0 | Status: SHIPPED | OUTPATIENT
Start: 2025-07-16

## 2025-08-06 ENCOUNTER — OFFICE VISIT (OUTPATIENT)
Dept: FAMILY MEDICINE | Facility: CLINIC | Age: 30
End: 2025-08-06
Payer: COMMERCIAL

## 2025-08-06 ENCOUNTER — RESULTS FOLLOW-UP (OUTPATIENT)
Dept: FAMILY MEDICINE | Facility: CLINIC | Age: 30
End: 2025-08-06

## 2025-08-06 VITALS
WEIGHT: 293 LBS | SYSTOLIC BLOOD PRESSURE: 148 MMHG | DIASTOLIC BLOOD PRESSURE: 80 MMHG | BODY MASS INDEX: 41.95 KG/M2 | OXYGEN SATURATION: 95 % | HEART RATE: 109 BPM | TEMPERATURE: 98.6 F | HEIGHT: 70 IN

## 2025-08-06 DIAGNOSIS — F33.0 MILD EPISODE OF RECURRENT MAJOR DEPRESSIVE DISORDER: ICD-10-CM

## 2025-08-06 DIAGNOSIS — E78.1 HYPERTRIGLYCERIDEMIA: Primary | ICD-10-CM

## 2025-08-06 DIAGNOSIS — L50.9 HIVES: ICD-10-CM

## 2025-08-06 DIAGNOSIS — F41.9 ANXIETY: ICD-10-CM

## 2025-08-06 DIAGNOSIS — I10 HYPERTENSION GOAL BP (BLOOD PRESSURE) < 140/90: ICD-10-CM

## 2025-08-06 DIAGNOSIS — E11.9 TYPE 2 DIABETES MELLITUS WITHOUT COMPLICATION, WITHOUT LONG-TERM CURRENT USE OF INSULIN (H): Primary | ICD-10-CM

## 2025-08-06 LAB
EST. AVERAGE GLUCOSE BLD GHB EST-MCNC: 240 MG/DL
HBA1C MFR BLD: 10 % (ref 0–5.6)

## 2025-08-06 PROCEDURE — 80061 LIPID PANEL: CPT | Performed by: NURSE PRACTITIONER

## 2025-08-06 PROCEDURE — 36415 COLL VENOUS BLD VENIPUNCTURE: CPT | Performed by: NURSE PRACTITIONER

## 2025-08-06 PROCEDURE — 83721 ASSAY OF BLOOD LIPOPROTEIN: CPT | Performed by: NURSE PRACTITIONER

## 2025-08-06 PROCEDURE — 99214 OFFICE O/P EST MOD 30 MIN: CPT | Performed by: NURSE PRACTITIONER

## 2025-08-06 PROCEDURE — 83036 HEMOGLOBIN GLYCOSYLATED A1C: CPT | Performed by: NURSE PRACTITIONER

## 2025-08-06 PROCEDURE — 80048 BASIC METABOLIC PNL TOTAL CA: CPT | Performed by: NURSE PRACTITIONER

## 2025-08-06 PROCEDURE — G2211 COMPLEX E/M VISIT ADD ON: HCPCS | Performed by: NURSE PRACTITIONER

## 2025-08-06 PROCEDURE — 96127 BRIEF EMOTIONAL/BEHAV ASSMT: CPT | Performed by: NURSE PRACTITIONER

## 2025-08-06 RX ORDER — VENLAFAXINE HYDROCHLORIDE 75 MG/1
75 CAPSULE, EXTENDED RELEASE ORAL DAILY
Qty: 90 CAPSULE | Refills: 3 | Status: SHIPPED | OUTPATIENT
Start: 2025-08-06

## 2025-08-06 RX ORDER — VENLAFAXINE HYDROCHLORIDE 150 MG/1
150 CAPSULE, EXTENDED RELEASE ORAL DAILY
Qty: 90 CAPSULE | Refills: 3 | Status: SHIPPED | OUTPATIENT
Start: 2025-08-06

## 2025-08-06 RX ORDER — LANCETS
EACH MISCELLANEOUS
Qty: 100 EACH | Refills: 6 | Status: SHIPPED | OUTPATIENT
Start: 2025-08-06

## 2025-08-06 RX ORDER — TRIAMCINOLONE ACETONIDE 1 MG/G
CREAM TOPICAL 2 TIMES DAILY
Qty: 45 G | Refills: 5 | Status: SHIPPED | OUTPATIENT
Start: 2025-08-06

## 2025-08-06 RX ORDER — METFORMIN HYDROCHLORIDE 500 MG/1
1000 TABLET, EXTENDED RELEASE ORAL
Qty: 180 TABLET | Refills: 3 | Status: SHIPPED | OUTPATIENT
Start: 2025-08-06

## 2025-08-06 RX ORDER — LISINOPRIL 20 MG/1
20 TABLET ORAL DAILY
Qty: 90 TABLET | Refills: 3 | Status: SHIPPED | OUTPATIENT
Start: 2025-08-06

## 2025-08-06 ASSESSMENT — ANXIETY QUESTIONNAIRES
1. FEELING NERVOUS, ANXIOUS, OR ON EDGE: SEVERAL DAYS
7. FEELING AFRAID AS IF SOMETHING AWFUL MIGHT HAPPEN: SEVERAL DAYS
8. IF YOU CHECKED OFF ANY PROBLEMS, HOW DIFFICULT HAVE THESE MADE IT FOR YOU TO DO YOUR WORK, TAKE CARE OF THINGS AT HOME, OR GET ALONG WITH OTHER PEOPLE?: NOT DIFFICULT AT ALL
4. TROUBLE RELAXING: SEVERAL DAYS
6. BECOMING EASILY ANNOYED OR IRRITABLE: SEVERAL DAYS
5. BEING SO RESTLESS THAT IT IS HARD TO SIT STILL: NOT AT ALL
3. WORRYING TOO MUCH ABOUT DIFFERENT THINGS: SEVERAL DAYS
GAD7 TOTAL SCORE: 6
IF YOU CHECKED OFF ANY PROBLEMS ON THIS QUESTIONNAIRE, HOW DIFFICULT HAVE THESE PROBLEMS MADE IT FOR YOU TO DO YOUR WORK, TAKE CARE OF THINGS AT HOME, OR GET ALONG WITH OTHER PEOPLE: NOT DIFFICULT AT ALL
GAD7 TOTAL SCORE: 6
GAD7 TOTAL SCORE: 6
7. FEELING AFRAID AS IF SOMETHING AWFUL MIGHT HAPPEN: SEVERAL DAYS
2. NOT BEING ABLE TO STOP OR CONTROL WORRYING: SEVERAL DAYS

## 2025-08-06 ASSESSMENT — ENCOUNTER SYMPTOMS: NERVOUS/ANXIOUS: 1

## 2025-08-06 ASSESSMENT — PAIN SCALES - GENERAL: PAINLEVEL_OUTOF10: NO PAIN (0)

## 2025-08-06 ASSESSMENT — ASTHMA QUESTIONNAIRES
QUESTION_5 LAST FOUR WEEKS HOW WOULD YOU RATE YOUR ASTHMA CONTROL: SOMEWHAT CONTROLLED
QUESTION_3 LAST FOUR WEEKS HOW OFTEN DID YOUR ASTHMA SYMPTOMS (WHEEZING, COUGHING, SHORTNESS OF BREATH, CHEST TIGHTNESS OR PAIN) WAKE YOU UP AT NIGHT OR EARLIER THAN USUAL IN THE MORNING: ONCE OR TWICE
QUESTION_2 LAST FOUR WEEKS HOW OFTEN HAVE YOU HAD SHORTNESS OF BREATH: ONCE OR TWICE A WEEK
ACT_TOTALSCORE: 20
QUESTION_1 LAST FOUR WEEKS HOW MUCH OF THE TIME DID YOUR ASTHMA KEEP YOU FROM GETTING AS MUCH DONE AT WORK, SCHOOL OR AT HOME: NONE OF THE TIME
QUESTION_4 LAST FOUR WEEKS HOW OFTEN HAVE YOU USED YOUR RESCUE INHALER OR NEBULIZER MEDICATION (SUCH AS ALBUTEROL): ONCE A WEEK OR LESS

## 2025-08-06 ASSESSMENT — PATIENT HEALTH QUESTIONNAIRE - PHQ9
10. IF YOU CHECKED OFF ANY PROBLEMS, HOW DIFFICULT HAVE THESE PROBLEMS MADE IT FOR YOU TO DO YOUR WORK, TAKE CARE OF THINGS AT HOME, OR GET ALONG WITH OTHER PEOPLE: NOT DIFFICULT AT ALL
SUM OF ALL RESPONSES TO PHQ QUESTIONS 1-9: 7
SUM OF ALL RESPONSES TO PHQ QUESTIONS 1-9: 7

## 2025-08-07 ENCOUNTER — PATIENT OUTREACH (OUTPATIENT)
Dept: CARE COORDINATION | Facility: CLINIC | Age: 30
End: 2025-08-07
Payer: COMMERCIAL

## 2025-08-07 LAB
ANION GAP SERPL CALCULATED.3IONS-SCNC: 17 MMOL/L (ref 7–15)
BUN SERPL-MCNC: 15.9 MG/DL (ref 6–20)
CALCIUM SERPL-MCNC: 8.8 MG/DL (ref 8.8–10.4)
CHLORIDE SERPL-SCNC: 98 MMOL/L (ref 98–107)
CHOLEST SERPL-MCNC: 162 MG/DL
CREAT SERPL-MCNC: 0.54 MG/DL (ref 0.51–0.95)
EGFRCR SERPLBLD CKD-EPI 2021: >90 ML/MIN/1.73M2
FASTING STATUS PATIENT QL REPORTED: NO
FASTING STATUS PATIENT QL REPORTED: NO
GLUCOSE SERPL-MCNC: 342 MG/DL (ref 70–99)
HCO3 SERPL-SCNC: 23 MMOL/L (ref 22–29)
HDLC SERPL-MCNC: 26 MG/DL
LDLC SERPL CALC-MCNC: ABNORMAL MG/DL
LDLC SERPL DIRECT ASSAY-MCNC: 74 MG/DL
NONHDLC SERPL-MCNC: 136 MG/DL
POTASSIUM SERPL-SCNC: 3.7 MMOL/L (ref 3.4–5.3)
SODIUM SERPL-SCNC: 138 MMOL/L (ref 135–145)
TRIGL SERPL-MCNC: 558 MG/DL

## 2025-08-07 RX ORDER — ATORVASTATIN CALCIUM 20 MG/1
20 TABLET, FILM COATED ORAL DAILY
Qty: 90 TABLET | Refills: 3 | Status: SHIPPED | OUTPATIENT
Start: 2025-08-07

## 2025-08-11 ENCOUNTER — PATIENT OUTREACH (OUTPATIENT)
Dept: CARE COORDINATION | Facility: CLINIC | Age: 30
End: 2025-08-11
Payer: COMMERCIAL